# Patient Record
Sex: MALE | Race: BLACK OR AFRICAN AMERICAN | NOT HISPANIC OR LATINO | URBAN - METROPOLITAN AREA
[De-identification: names, ages, dates, MRNs, and addresses within clinical notes are randomized per-mention and may not be internally consistent; named-entity substitution may affect disease eponyms.]

---

## 2023-10-18 ENCOUNTER — OFFICE (OUTPATIENT)
Dept: URBAN - METROPOLITAN AREA CLINIC 13 | Facility: CLINIC | Age: 68
End: 2023-10-18

## 2023-10-18 VITALS
HEART RATE: 72 BPM | DIASTOLIC BLOOD PRESSURE: 64 MMHG | SYSTOLIC BLOOD PRESSURE: 122 MMHG | OXYGEN SATURATION: 85 % | WEIGHT: 137 LBS | HEIGHT: 72 IN

## 2023-10-18 DIAGNOSIS — D64.9 ANEMIA, UNSPECIFIED: ICD-10-CM

## 2023-10-18 DIAGNOSIS — R19.7 DIARRHEA, UNSPECIFIED: ICD-10-CM

## 2023-10-18 DIAGNOSIS — R93.3 ABNORMAL FINDINGS ON DIAGNOSTIC IMAGING OF OTHER PARTS OF DI: ICD-10-CM

## 2023-10-18 DIAGNOSIS — Z79.01 LONG TERM (CURRENT) USE OF ANTICOAGULANTS: ICD-10-CM

## 2023-10-18 DIAGNOSIS — R13.10 DYSPHAGIA, UNSPECIFIED: ICD-10-CM

## 2023-10-18 DIAGNOSIS — R63.4 ABNORMAL WEIGHT LOSS: ICD-10-CM

## 2023-10-18 DIAGNOSIS — B37.81 CANDIDAL ESOPHAGITIS: ICD-10-CM

## 2023-10-18 PROCEDURE — 99204 OFFICE O/P NEW MOD 45 MIN: CPT | Performed by: INTERNAL MEDICINE

## 2023-10-19 LAB
AFP - TUMOR MARKER: 2.8 NG/ML
ALPHA-FETOPROTEIN (AFP), TUMOR MARKER: 2.7 NG/ML
C-REACTIVE PROTEIN: 0.37 MG/DL
CA 19-9: 10 U/ML
CANCER ANTIGEN (CA) 19-9: 13 U/ML
CARCINOEMBRYONIC ANTIGEN (CEA): 6.2 NG/ML
CEA: 2.8 NG/ML
COMPREHENSIVE METABOLIC PANEL: A/G RATIO: 1.4 RATIO
COMPREHENSIVE METABOLIC PANEL: ALBUMIN: 4.1 G/DL
COMPREHENSIVE METABOLIC PANEL: ALK PHOSPHATASE: 117 U/L
COMPREHENSIVE METABOLIC PANEL: ALT: 10 U/L
COMPREHENSIVE METABOLIC PANEL: AST: 39 U/L
COMPREHENSIVE METABOLIC PANEL: BILIRUBIN,TOTAL: 0.4 MG/DL
COMPREHENSIVE METABOLIC PANEL: BLOOD UREA NITROGEN: 12 MG/DL
COMPREHENSIVE METABOLIC PANEL: BUN/CREAT RATIO: 13
COMPREHENSIVE METABOLIC PANEL: CALCIUM: 9.6 MG/DL
COMPREHENSIVE METABOLIC PANEL: CHLORIDE: 106 MEQ/L
COMPREHENSIVE METABOLIC PANEL: CO2: 24 MEQ/L
COMPREHENSIVE METABOLIC PANEL: CREATININE: 0.9 MG/DL
COMPREHENSIVE METABOLIC PANEL: FASTING STATUS: (no result)
COMPREHENSIVE METABOLIC PANEL: GLOBULIN: 3 G/DL
COMPREHENSIVE METABOLIC PANEL: GLOMERULAR FILTRATION RATE (GFR): 92 MLS/MIN/1.73M2
COMPREHENSIVE METABOLIC PANEL: GLUCOSE,RANDOM: 89 MG/DL
COMPREHENSIVE METABOLIC PANEL: POTASSIUM: 4.7 MEQ/L
COMPREHENSIVE METABOLIC PANEL: SODIUM: 140 MEQ/L
COMPREHENSIVE METABOLIC PANEL: TOTAL PROTEIN: 7.1 G/DL
FERRITIN: 252 NG/ML
FOLATE: 6.8 NG/ML
FREE T4 AND TSH: FREE T4: 0.93 NG/DL
FREE T4 AND TSH: TSH: 1.21 MCIU/ML
IRON PROFILE: IRON BINDING CAPACITY: 254 MCG/DL
IRON PROFILE: IRON SATURATION: 35 %
IRON PROFILE: IRON: 88 MCG/DL
VITAMIN B12: 324 PG/ML

## 2023-11-27 ENCOUNTER — AMBULATORY SURGICAL CENTER (OUTPATIENT)
Dept: URBAN - METROPOLITAN AREA SURGERY 4 | Facility: SURGERY | Age: 68
End: 2023-11-27
Payer: COMMERCIAL

## 2023-11-27 ENCOUNTER — OFFICE (OUTPATIENT)
Dept: URBAN - METROPOLITAN AREA PATHOLOGY 1 | Facility: PATHOLOGY | Age: 68
End: 2023-11-27
Payer: COMMERCIAL

## 2023-11-27 VITALS
SYSTOLIC BLOOD PRESSURE: 153 MMHG | DIASTOLIC BLOOD PRESSURE: 78 MMHG | SYSTOLIC BLOOD PRESSURE: 102 MMHG | TEMPERATURE: 98 F | SYSTOLIC BLOOD PRESSURE: 121 MMHG | SYSTOLIC BLOOD PRESSURE: 156 MMHG | HEART RATE: 68 BPM | SYSTOLIC BLOOD PRESSURE: 104 MMHG | SYSTOLIC BLOOD PRESSURE: 105 MMHG | SYSTOLIC BLOOD PRESSURE: 105 MMHG | SYSTOLIC BLOOD PRESSURE: 108 MMHG | OXYGEN SATURATION: 98 % | SYSTOLIC BLOOD PRESSURE: 153 MMHG | SYSTOLIC BLOOD PRESSURE: 138 MMHG | HEART RATE: 61 BPM | DIASTOLIC BLOOD PRESSURE: 73 MMHG | DIASTOLIC BLOOD PRESSURE: 59 MMHG | DIASTOLIC BLOOD PRESSURE: 82 MMHG | DIASTOLIC BLOOD PRESSURE: 61 MMHG | SYSTOLIC BLOOD PRESSURE: 121 MMHG | HEIGHT: 72 IN | HEART RATE: 60 BPM | SYSTOLIC BLOOD PRESSURE: 151 MMHG | SYSTOLIC BLOOD PRESSURE: 118 MMHG | HEART RATE: 67 BPM | DIASTOLIC BLOOD PRESSURE: 58 MMHG | HEART RATE: 68 BPM | SYSTOLIC BLOOD PRESSURE: 138 MMHG | OXYGEN SATURATION: 98 % | HEART RATE: 67 BPM | OXYGEN SATURATION: 100 % | RESPIRATION RATE: 11 BRPM | DIASTOLIC BLOOD PRESSURE: 59 MMHG | RESPIRATION RATE: 11 BRPM | DIASTOLIC BLOOD PRESSURE: 78 MMHG | DIASTOLIC BLOOD PRESSURE: 81 MMHG | RESPIRATION RATE: 20 BRPM | RESPIRATION RATE: 18 BRPM | OXYGEN SATURATION: 100 % | HEART RATE: 65 BPM | SYSTOLIC BLOOD PRESSURE: 135 MMHG | DIASTOLIC BLOOD PRESSURE: 70 MMHG | TEMPERATURE: 98 F | HEART RATE: 66 BPM | SYSTOLIC BLOOD PRESSURE: 129 MMHG | RESPIRATION RATE: 16 BRPM | HEART RATE: 71 BPM | DIASTOLIC BLOOD PRESSURE: 82 MMHG | SYSTOLIC BLOOD PRESSURE: 108 MMHG | HEART RATE: 71 BPM | RESPIRATION RATE: 15 BRPM | SYSTOLIC BLOOD PRESSURE: 151 MMHG | RESPIRATION RATE: 18 BRPM | HEIGHT: 72 IN | OXYGEN SATURATION: 97 % | DIASTOLIC BLOOD PRESSURE: 61 MMHG | RESPIRATION RATE: 20 BRPM | DIASTOLIC BLOOD PRESSURE: 66 MMHG | WEIGHT: 137 LBS | RESPIRATION RATE: 15 BRPM | DIASTOLIC BLOOD PRESSURE: 81 MMHG | DIASTOLIC BLOOD PRESSURE: 73 MMHG | SYSTOLIC BLOOD PRESSURE: 107 MMHG | RESPIRATION RATE: 16 BRPM | SYSTOLIC BLOOD PRESSURE: 156 MMHG | DIASTOLIC BLOOD PRESSURE: 77 MMHG | DIASTOLIC BLOOD PRESSURE: 58 MMHG | SYSTOLIC BLOOD PRESSURE: 102 MMHG | OXYGEN SATURATION: 97 % | SYSTOLIC BLOOD PRESSURE: 104 MMHG | SYSTOLIC BLOOD PRESSURE: 129 MMHG | SYSTOLIC BLOOD PRESSURE: 107 MMHG | WEIGHT: 137 LBS | HEART RATE: 61 BPM | DIASTOLIC BLOOD PRESSURE: 77 MMHG | SYSTOLIC BLOOD PRESSURE: 118 MMHG | HEART RATE: 65 BPM | HEART RATE: 63 BPM | HEART RATE: 60 BPM | DIASTOLIC BLOOD PRESSURE: 75 MMHG | HEART RATE: 63 BPM | HEART RATE: 66 BPM | DIASTOLIC BLOOD PRESSURE: 66 MMHG | DIASTOLIC BLOOD PRESSURE: 75 MMHG | DIASTOLIC BLOOD PRESSURE: 70 MMHG | SYSTOLIC BLOOD PRESSURE: 135 MMHG

## 2023-11-27 DIAGNOSIS — K22.2 ESOPHAGEAL OBSTRUCTION: ICD-10-CM

## 2023-11-27 DIAGNOSIS — K63.5 POLYP OF COLON: ICD-10-CM

## 2023-11-27 DIAGNOSIS — R13.10 DYSPHAGIA, UNSPECIFIED: ICD-10-CM

## 2023-11-27 DIAGNOSIS — D12.2 BENIGN NEOPLASM OF ASCENDING COLON: ICD-10-CM

## 2023-11-27 DIAGNOSIS — K31.89 OTHER DISEASES OF STOMACH AND DUODENUM: ICD-10-CM

## 2023-11-27 DIAGNOSIS — Z12.11 ENCOUNTER FOR SCREENING FOR MALIGNANT NEOPLASM OF COLON: ICD-10-CM

## 2023-11-27 PROCEDURE — 43239 EGD BIOPSY SINGLE/MULTIPLE: CPT | Performed by: INTERNAL MEDICINE

## 2023-11-27 PROCEDURE — 88342 IMHCHEM/IMCYTCHM 1ST ANTB: CPT | Performed by: PATHOLOGY

## 2023-11-27 PROCEDURE — 45385 COLONOSCOPY W/LESION REMOVAL: CPT | Mod: 33 | Performed by: INTERNAL MEDICINE

## 2023-11-27 PROCEDURE — 43450 DILATE ESOPHAGUS 1/MULT PASS: CPT | Performed by: INTERNAL MEDICINE

## 2023-11-27 PROCEDURE — 88305 TISSUE EXAM BY PATHOLOGIST: CPT | Performed by: PATHOLOGY

## 2023-11-27 NOTE — SERVICENOTES
Right colon examined twice. [employer] artificial intelligence technology used to enhance polyp detection

## 2023-11-27 NOTE — SERVICEHPINOTES
Intermittent dysphagia some steroid inhaler usage,  Also a history of acid reflux.For a number of years.Screening colonoscopy no lower symptoms currently

## 2023-11-27 NOTE — SERVICENOTES
Right colon examined twice. [employer] artificial intelligence technology used to enhance polyp detection
no discharge, no irritation, no pain, no redness, and no visual changes.

## 2023-12-01 LAB
PDF: PDF REPORT: (no result)
PDF: PDF REPORT: (no result)

## 2024-02-07 ENCOUNTER — OFFICE (OUTPATIENT)
Dept: URBAN - METROPOLITAN AREA CLINIC 13 | Facility: CLINIC | Age: 69
End: 2024-02-07

## 2024-02-19 ENCOUNTER — OFFICE (OUTPATIENT)
Dept: URBAN - METROPOLITAN AREA CLINIC 13 | Facility: CLINIC | Age: 69
End: 2024-02-19